# Patient Record
Sex: MALE | Race: WHITE | NOT HISPANIC OR LATINO | Employment: PART TIME | URBAN - METROPOLITAN AREA
[De-identification: names, ages, dates, MRNs, and addresses within clinical notes are randomized per-mention and may not be internally consistent; named-entity substitution may affect disease eponyms.]

---

## 2023-05-04 ENCOUNTER — OFFICE VISIT (OUTPATIENT)
Dept: FAMILY MEDICINE | Facility: CLINIC | Age: 43
End: 2023-05-04
Payer: COMMERCIAL

## 2023-05-04 ENCOUNTER — LAB VISIT (OUTPATIENT)
Dept: LAB | Facility: HOSPITAL | Age: 43
End: 2023-05-04
Attending: FAMILY MEDICINE
Payer: COMMERCIAL

## 2023-05-04 VITALS
HEART RATE: 94 BPM | SYSTOLIC BLOOD PRESSURE: 136 MMHG | DIASTOLIC BLOOD PRESSURE: 88 MMHG | WEIGHT: 214.38 LBS | HEIGHT: 72 IN | BODY MASS INDEX: 29.04 KG/M2 | OXYGEN SATURATION: 98 % | RESPIRATION RATE: 15 BRPM

## 2023-05-04 DIAGNOSIS — R19.7 DIARRHEA OF PRESUMED INFECTIOUS ORIGIN: ICD-10-CM

## 2023-05-04 DIAGNOSIS — R11.14 BILIOUS VOMITING WITH NAUSEA: Primary | ICD-10-CM

## 2023-05-04 DIAGNOSIS — R11.14 BILIOUS VOMITING WITH NAUSEA: ICD-10-CM

## 2023-05-04 DIAGNOSIS — R42 DIZZINESS: ICD-10-CM

## 2023-05-04 LAB
ANION GAP SERPL CALC-SCNC: 11 MMOL/L (ref 8–16)
BASOPHILS # BLD AUTO: 0.03 K/UL (ref 0–0.2)
BASOPHILS NFR BLD: 0.5 % (ref 0–1.9)
BUN SERPL-MCNC: 14 MG/DL (ref 6–20)
CALCIUM SERPL-MCNC: 9.4 MG/DL (ref 8.7–10.5)
CHLORIDE SERPL-SCNC: 108 MMOL/L (ref 95–110)
CO2 SERPL-SCNC: 21 MMOL/L (ref 23–29)
CREAT SERPL-MCNC: 0.9 MG/DL (ref 0.5–1.4)
DIFFERENTIAL METHOD: NORMAL
EOSINOPHIL # BLD AUTO: 0.1 K/UL (ref 0–0.5)
EOSINOPHIL NFR BLD: 1.8 % (ref 0–8)
ERYTHROCYTE [DISTWIDTH] IN BLOOD BY AUTOMATED COUNT: 12.6 % (ref 11.5–14.5)
EST. GFR  (NO RACE VARIABLE): >60 ML/MIN/1.73 M^2
GLUCOSE SERPL-MCNC: 115 MG/DL (ref 70–110)
HCT VFR BLD AUTO: 46.8 % (ref 40–54)
HGB BLD-MCNC: 15.9 G/DL (ref 14–18)
IMM GRANULOCYTES # BLD AUTO: 0.01 K/UL (ref 0–0.04)
IMM GRANULOCYTES NFR BLD AUTO: 0.2 % (ref 0–0.5)
LYMPHOCYTES # BLD AUTO: 2.1 K/UL (ref 1–4.8)
LYMPHOCYTES NFR BLD: 33 % (ref 18–48)
MCH RBC QN AUTO: 30.5 PG (ref 27–31)
MCHC RBC AUTO-ENTMCNC: 34 G/DL (ref 32–36)
MCV RBC AUTO: 90 FL (ref 82–98)
MONOCYTES # BLD AUTO: 0.5 K/UL (ref 0.3–1)
MONOCYTES NFR BLD: 7.7 % (ref 4–15)
NEUTROPHILS # BLD AUTO: 3.6 K/UL (ref 1.8–7.7)
NEUTROPHILS NFR BLD: 56.8 % (ref 38–73)
NRBC BLD-RTO: 0 /100 WBC
PLATELET # BLD AUTO: 293 K/UL (ref 150–450)
PMV BLD AUTO: 11.5 FL (ref 9.2–12.9)
POTASSIUM SERPL-SCNC: 3.5 MMOL/L (ref 3.5–5.1)
RBC # BLD AUTO: 5.22 M/UL (ref 4.6–6.2)
SODIUM SERPL-SCNC: 140 MMOL/L (ref 136–145)
WBC # BLD AUTO: 6.25 K/UL (ref 3.9–12.7)

## 2023-05-04 PROCEDURE — 3008F PR BODY MASS INDEX (BMI) DOCUMENTED: ICD-10-PCS | Mod: S$GLB,,, | Performed by: FAMILY MEDICINE

## 2023-05-04 PROCEDURE — 3079F DIAST BP 80-89 MM HG: CPT | Mod: S$GLB,,, | Performed by: FAMILY MEDICINE

## 2023-05-04 PROCEDURE — 99999 PR PBB SHADOW E&M-NEW PATIENT-LVL III: ICD-10-PCS | Mod: PBBFAC,,, | Performed by: FAMILY MEDICINE

## 2023-05-04 PROCEDURE — 3008F BODY MASS INDEX DOCD: CPT | Mod: S$GLB,,, | Performed by: FAMILY MEDICINE

## 2023-05-04 PROCEDURE — 99203 OFFICE O/P NEW LOW 30 MIN: CPT | Mod: S$GLB,,, | Performed by: FAMILY MEDICINE

## 2023-05-04 PROCEDURE — 1160F PR REVIEW ALL MEDS BY PRESCRIBER/CLIN PHARMACIST DOCUMENTED: ICD-10-PCS | Mod: S$GLB,,, | Performed by: FAMILY MEDICINE

## 2023-05-04 PROCEDURE — 99203 PR OFFICE/OUTPT VISIT, NEW, LEVL III, 30-44 MIN: ICD-10-PCS | Mod: S$GLB,,, | Performed by: FAMILY MEDICINE

## 2023-05-04 PROCEDURE — 3075F SYST BP GE 130 - 139MM HG: CPT | Mod: S$GLB,,, | Performed by: FAMILY MEDICINE

## 2023-05-04 PROCEDURE — 1159F PR MEDICATION LIST DOCUMENTED IN MEDICAL RECORD: ICD-10-PCS | Mod: S$GLB,,, | Performed by: FAMILY MEDICINE

## 2023-05-04 PROCEDURE — 3075F PR MOST RECENT SYSTOLIC BLOOD PRESS GE 130-139MM HG: ICD-10-PCS | Mod: S$GLB,,, | Performed by: FAMILY MEDICINE

## 2023-05-04 PROCEDURE — 1159F MED LIST DOCD IN RCRD: CPT | Mod: S$GLB,,, | Performed by: FAMILY MEDICINE

## 2023-05-04 PROCEDURE — 3079F PR MOST RECENT DIASTOLIC BLOOD PRESSURE 80-89 MM HG: ICD-10-PCS | Mod: S$GLB,,, | Performed by: FAMILY MEDICINE

## 2023-05-04 PROCEDURE — 80048 BASIC METABOLIC PNL TOTAL CA: CPT | Performed by: FAMILY MEDICINE

## 2023-05-04 PROCEDURE — 99999 PR PBB SHADOW E&M-NEW PATIENT-LVL III: CPT | Mod: PBBFAC,,, | Performed by: FAMILY MEDICINE

## 2023-05-04 PROCEDURE — 85025 COMPLETE CBC W/AUTO DIFF WBC: CPT | Performed by: FAMILY MEDICINE

## 2023-05-04 PROCEDURE — 36415 COLL VENOUS BLD VENIPUNCTURE: CPT | Performed by: FAMILY MEDICINE

## 2023-05-04 PROCEDURE — 1160F RVW MEDS BY RX/DR IN RCRD: CPT | Mod: S$GLB,,, | Performed by: FAMILY MEDICINE

## 2023-05-04 RX ORDER — ONDANSETRON 4 MG/1
4 TABLET, ORALLY DISINTEGRATING ORAL EVERY 6 HOURS PRN
Qty: 30 TABLET | Refills: 0 | Status: SHIPPED | OUTPATIENT
Start: 2023-05-04 | End: 2023-05-17

## 2023-05-04 NOTE — PROGRESS NOTES
Subjective:       Patient ID: Luigi Daniels is a 43 y.o. male.    Chief Complaint: Nausea, Fatigue, and Diarrhea    Started with nausea and vomiting on Saturday. He thought it was from the paint. His balance is off. Feels like the room is spinning. He is having some diarrhea. He will have significant abdominal cramping with minimal issues. Generally he is feeling somewhat decent. But overall his stomach is hurting diffusely and and he is nauseated. He does get some relief when he uses the bathroom. No blood in his bowel movements. The first day he vomited ramen and watermelon. The next day he could keep down a hamburger. Pickles make him feel better. He is able to keep liquids down now.     Nausea  Associated symptoms include fatigue, nausea and vertigo. Pertinent negatives include no chest pain or headaches.   Fatigue  Associated symptoms include fatigue, nausea and vertigo. Pertinent negatives include no chest pain or headaches.   Diarrhea   Pertinent negatives include no headaches.   Review of Systems   Constitutional:  Positive for fatigue.   Cardiovascular:  Negative for chest pain and palpitations.   Gastrointestinal:  Positive for diarrhea and nausea.   Neurological:  Positive for dizziness and vertigo. Negative for syncope, speech difficulty, headaches, coordination difficulties, memory loss and coordination difficulties.       Objective:      Physical Exam  Vitals and nursing note reviewed.   Constitutional:       General: He is not in acute distress.     Appearance: He is ill-appearing.   HENT:      Right Ear: Tympanic membrane, ear canal and external ear normal.      Left Ear: Tympanic membrane, ear canal and external ear normal.   Cardiovascular:      Rate and Rhythm: Normal rate and regular rhythm.      Heart sounds: No murmur heard.  Pulmonary:      Effort: Pulmonary effort is normal.      Breath sounds: Normal breath sounds. No wheezing.   Abdominal:      General: Abdomen is flat.      Palpations:  Abdomen is soft. There is no mass.      Tenderness: There is abdominal tenderness (mild diffuse- non focal- not acute). There is no right CVA tenderness or left CVA tenderness.      Hernia: No hernia is present.   Skin:     General: Skin is warm and dry.      Findings: No rash.   Neurological:      Mental Status: He is alert.   Psychiatric:         Mood and Affect: Mood normal.         Behavior: Behavior normal.       Assessment:       1. Bilious vomiting with nausea    2. Diarrhea of presumed infectious origin    3. Dizziness        Plan:       Problem List Items Addressed This Visit    None  Visit Diagnoses       Bilious vomiting with nausea    -  Primary    Relevant Medications    ondansetron (ZOFRAN-ODT) 4 MG TbDL    Other Relevant Orders    Basic Metabolic Panel    CBC Auto Differential    Diarrhea of presumed infectious origin        Dizziness        Relevant Orders    Basic Metabolic Panel    CBC Auto Differential

## 2023-05-06 ENCOUNTER — OFFICE VISIT (OUTPATIENT)
Dept: URGENT CARE | Facility: CLINIC | Age: 43
End: 2023-05-06
Payer: COMMERCIAL

## 2023-05-06 ENCOUNTER — PATIENT MESSAGE (OUTPATIENT)
Dept: FAMILY MEDICINE | Facility: CLINIC | Age: 43
End: 2023-05-06
Payer: COMMERCIAL

## 2023-05-06 VITALS
BODY MASS INDEX: 28.99 KG/M2 | HEIGHT: 72 IN | HEART RATE: 75 BPM | SYSTOLIC BLOOD PRESSURE: 148 MMHG | OXYGEN SATURATION: 96 % | TEMPERATURE: 98 F | WEIGHT: 214 LBS | DIASTOLIC BLOOD PRESSURE: 100 MMHG

## 2023-05-06 DIAGNOSIS — R42 DIZZINESS: Primary | ICD-10-CM

## 2023-05-06 PROCEDURE — 99213 OFFICE O/P EST LOW 20 MIN: CPT | Mod: ,,, | Performed by: NURSE PRACTITIONER

## 2023-05-06 PROCEDURE — 99213 PR OFFICE/OUTPT VISIT, EST, LEVL III, 20-29 MIN: ICD-10-PCS | Mod: ,,, | Performed by: NURSE PRACTITIONER

## 2023-05-06 RX ORDER — MECLIZINE HYDROCHLORIDE 25 MG/1
25 TABLET ORAL EVERY 8 HOURS PRN
Qty: 15 TABLET | Refills: 0 | Status: SHIPPED | OUTPATIENT
Start: 2023-05-06 | End: 2023-05-17

## 2023-05-06 NOTE — PROGRESS NOTES
Subjective:       Patient ID: Luigi Daniels is a 43 y.o. male.    Vitals:  height is 6' (1.829 m) and weight is 97.1 kg (214 lb). His oral temperature is 98.3 °F (36.8 °C). His blood pressure is 142/118 (abnormal, pended) and his pulse is 75. His oxygen saturation is 96%.     Chief Complaint: Dizziness (Dizziness x 1 week. )    This is a 43 y.o. male who presents today with a chief complaint of dizziness x 1 week.   Patient went to see Dr. Christiansen on Thursday and prescribed zofran.    Pt reports some improvement after taking this along with zofran and dramamine but reports dizziness is still present today. Pt reports symptoms improve after laying down. Pt denies any other symptoms of chest pain or systemic symptoms.         Dizziness:   Chronicity:  Recurrent  Onset:  1 to 4 weeks ago  Progression since onset:  Unchanged  Pain Scale:  0/10  Severity:  Mild  Duration:  Very brief  Dizziness characteristics:  Off-balance and motion-sickness  Time frame: when he walks     he is dizzy.   Associated symptoms: headaches and light-headedness.no diaphoresis, no syncope, no palpitations, no facial weakness and no chest pain.  Exacerbated by: walking.  Treatments tried: zofran.  Improvements on treatment:  Mild    Constitution: Negative. Negative for chills, sweating, fatigue and generalized weakness.   HENT: Negative.     Neck: neck negative.   Cardiovascular: Negative.  Negative for chest pain, palpitations, sob on exertion and passing out.   Respiratory: Negative.     Gastrointestinal: Negative.    Neurological:  Positive for dizziness, light-headedness and headaches. Negative for passing out, speech difficulty, coordination disturbances, loss of balance, disorientation and altered mental status.   Psychiatric/Behavioral:  Negative for altered mental status, disorientation, confusion, agitation and nervous/anxious. The patient is not nervous/anxious.          Objective:      Physical Exam   Constitutional: He is oriented  to person, place, and time. He appears well-developed. He is cooperative.   HENT:   Head: Normocephalic and atraumatic.   Ears:   Right Ear: Hearing, tympanic membrane, external ear and ear canal normal.   Left Ear: Hearing, tympanic membrane, external ear and ear canal normal.   Nose: Nose normal. No mucosal edema or nasal deformity. No epistaxis. Right sinus exhibits no maxillary sinus tenderness and no frontal sinus tenderness. Left sinus exhibits no maxillary sinus tenderness and no frontal sinus tenderness.   Mouth/Throat: Uvula is midline, oropharynx is clear and moist and mucous membranes are normal. No trismus in the jaw. Normal dentition. No uvula swelling.   Eyes: Conjunctivae and lids are normal.   Neck: Trachea normal and phonation normal. Neck supple.   Cardiovascular: Normal rate, regular rhythm, normal heart sounds and normal pulses.   Pulmonary/Chest: Effort normal and breath sounds normal. He has no decreased breath sounds. He has no wheezes. He has no rhonchi. He has no rales.   Abdominal: Normal appearance and bowel sounds are normal. Soft.   Musculoskeletal: Normal range of motion.         General: Normal range of motion.   Neurological: He is alert and oriented to person, place, and time. He exhibits normal muscle tone.   Skin: Skin is warm, dry and intact.   Psychiatric: His speech is normal and behavior is normal. Judgment and thought content normal.   Nursing note and vitals reviewed.      Past medical history and current medications reviewed.       Assessment:           1. Dizziness              Plan:         Dizziness  -     meclizine (ANTIVERT) 25 mg tablet; Take 1 tablet (25 mg total) by mouth every 8 (eight) hours as needed for Dizziness.  Dispense: 15 tablet; Refill: 0             Patient Instructions   Report to ER for any worsening of symptoms.   May continue to take zofran PRN.   Follow up with PCP for further work up and evaluation.            Medical Decision Making:   Urgent Care  Management:  Orthostat BP does have a decrease when rising from lying to sitting to standing. Pt reports that he feels no chagne in symptoms when changing positions. I will treat with meclizine symptomatically and pt will follow up with PCP for momo tesfaye.

## 2023-05-06 NOTE — PATIENT INSTRUCTIONS
Report to ER for any worsening of symptoms.   May continue to take zofran PRN.   Follow up with PCP for further work up and evaluation.

## 2023-05-08 ENCOUNTER — TELEPHONE (OUTPATIENT)
Dept: FAMILY MEDICINE | Facility: CLINIC | Age: 43
End: 2023-05-08
Payer: COMMERCIAL

## 2023-05-08 NOTE — TELEPHONE ENCOUNTER
I do not have any cholesterol labs on him (we will need these sent our way).   Exam on the day I saw him was consistent with peripheral vertigo.  If this is all vertigo- may be relieved by home Epley maneuvers- recommend YouTubing these- and sleeping in a reclined position after completing.   I can refer to Ear, Nose and Throat for the Vertigo for evaluation if he would like. We do have an ENT that works out of the [place] office.  For cardiovascular eval and establish care visit- we'll have to get him back in to see me.   In the meantime, if he has any focal neurologic symptoms, change in speech, limb weakness, facial droop, numbness, needs ED eval.

## 2023-05-08 NOTE — TELEPHONE ENCOUNTER
"Spoke with pt. Pt reports  dizzy and light headed, disoriented at time. Pt states, " Dr. Christiansen told me to just message her and let her know. My body feels ok for the most part, I should not still feel like this after a week" please review.   "

## 2023-05-08 NOTE — TELEPHONE ENCOUNTER
Any improvement with the antivert prescribed by urgent care?   Any positional change to the dizziness- ie is it different with different head positions?  Any pain in the ears or change to hearing?   Is the abdominal pain, nausea, diarrhea better?  Any new headache?  Lightheadedness v. Dizziness- is the room spinning in one direction or does he feel like he's getting tunnel vision and going to pass out?

## 2023-05-08 NOTE — TELEPHONE ENCOUNTER
"Any improvement with the antivert prescribed by urgent care? Yes  Any positional change to the dizziness- ie is it different with different head positions? Yes   Any pain in the ears or change to hearing? No pain at this time  Is the abdominal pain, nausea, diarrhea better? All symptoms resolved.   Any new headache? No.   Lightheadedness v. Dizziness- is the room spinning in one direction or does he feel like he's getting tunnel vision and going to pass out. Pt states the tunnel vision.   Pt states, " I know my cholesterol is high, I feel this has something to do with it. "   "

## 2023-05-08 NOTE — TELEPHONE ENCOUNTER
----- Message from Zhanna Chowdhury sent at 5/8/2023 11:31 AM CDT -----  Regarding: sooner apt  Contact: patient  Type:  Sooner Appointment Request    Caller is requesting a sooner appointment.  Caller declined first available appointment listed below.  Caller will not accept being placed on the waitlist and is requesting a message be sent to doctor.    Name of Caller:  Patient  When is the first available appointment?    Symptoms:  dizzy and light headed cant walk straight  Best Call Back Number:  [phone]     Additional Information:  Please call to schedule thanks!

## 2023-05-09 ENCOUNTER — TELEPHONE (OUTPATIENT)
Dept: FAMILY MEDICINE | Facility: CLINIC | Age: 43
End: 2023-05-09
Payer: COMMERCIAL

## 2023-05-09 ENCOUNTER — TELEPHONE (OUTPATIENT)
Dept: CARDIOLOGY | Facility: CLINIC | Age: 43
End: 2023-05-09
Payer: COMMERCIAL

## 2023-05-09 NOTE — TELEPHONE ENCOUNTER
Returned pts call. He said he can't walk without dropping to the ground. He gets sharp pains in his chest. Numbness and tingling in his right arm. He has a feeling of something being off. He is worried about blood clots and heart problems. I advised since his symptoms are worsening from when he saw Dr. Christiansen to go to the ED so he can be evaluated. He stated he would speak with his wife.   ----- Message from Zhanna Chowdhury sent at 5/9/2023 10:17 AM CDT -----  Regarding: advise  Contact: patient  Type: Needs Medical Advice  Who Called:  Patient  Symptoms (please be specific):  trouble walking  How long has patient had these symptoms:    Pharmacy name and phone #:    Best Call Back Number: [phone]     Additional Information: Please call to schedule and advise. He would like to be seen this week. thanks!         Patient/Mother

## 2023-05-09 NOTE — TELEPHONE ENCOUNTER
----- Message from Idalia Barragan LPN sent at 5/9/2023 11:44 AM CDT -----  Regarding: FW: advise  Contact: patient  Returned pts call. He stated his symptoms are getting worse. He said he takes a few steps and drops to the ground. He is having sharp pains in his chest with numbness in his right arm. He stated he is worried he has blood clots. I advised him to go to the ED for a full evaluation since they can see him now and have him fully tested. I know he has an appt with you 05/17. We can follow up if necessary. Just letting you know that he may go to ED.     Thanks,     Idalia Barragan LPN  ----- Message -----  From: Zhanna Chowdhury  Sent: 5/9/2023  10:18 AM CDT  To: Manny VERONICA Staff  Subject: advise                                           Type: Needs Medical Advice  Who Called:  Patient  Symptoms (please be specific):  trouble walking  How long has patient had these symptoms:    Pharmacy name and phone #:    Best Call Back Number: [phone]     Additional Information: Please call to schedule and advise. He would like to be seen this week. thanks!

## 2023-05-10 ENCOUNTER — OFFICE VISIT (OUTPATIENT)
Dept: OTOLARYNGOLOGY | Facility: CLINIC | Age: 43
End: 2023-05-10
Payer: COMMERCIAL

## 2023-05-10 VITALS — DIASTOLIC BLOOD PRESSURE: 97 MMHG | WEIGHT: 213.81 LBS | BODY MASS INDEX: 29 KG/M2 | SYSTOLIC BLOOD PRESSURE: 145 MMHG

## 2023-05-10 DIAGNOSIS — R42 DIZZINESS: ICD-10-CM

## 2023-05-10 DIAGNOSIS — Z11.59 NEED FOR HEPATITIS C SCREENING TEST: ICD-10-CM

## 2023-05-10 PROCEDURE — 99203 OFFICE O/P NEW LOW 30 MIN: CPT | Mod: S$GLB,,, | Performed by: OTOLARYNGOLOGY

## 2023-05-10 PROCEDURE — 3008F BODY MASS INDEX DOCD: CPT | Mod: S$GLB,,, | Performed by: OTOLARYNGOLOGY

## 2023-05-10 PROCEDURE — 99999 PR PBB SHADOW E&M-EST. PATIENT-LVL III: ICD-10-PCS | Mod: PBBFAC,,, | Performed by: OTOLARYNGOLOGY

## 2023-05-10 PROCEDURE — 3077F PR MOST RECENT SYSTOLIC BLOOD PRESSURE >= 140 MM HG: ICD-10-PCS | Mod: S$GLB,,, | Performed by: OTOLARYNGOLOGY

## 2023-05-10 PROCEDURE — 3008F PR BODY MASS INDEX (BMI) DOCUMENTED: ICD-10-PCS | Mod: S$GLB,,, | Performed by: OTOLARYNGOLOGY

## 2023-05-10 PROCEDURE — 3080F PR MOST RECENT DIASTOLIC BLOOD PRESSURE >= 90 MM HG: ICD-10-PCS | Mod: S$GLB,,, | Performed by: OTOLARYNGOLOGY

## 2023-05-10 PROCEDURE — 99999 PR PBB SHADOW E&M-EST. PATIENT-LVL III: CPT | Mod: PBBFAC,,, | Performed by: OTOLARYNGOLOGY

## 2023-05-10 PROCEDURE — 3080F DIAST BP >= 90 MM HG: CPT | Mod: S$GLB,,, | Performed by: OTOLARYNGOLOGY

## 2023-05-10 PROCEDURE — 3077F SYST BP >= 140 MM HG: CPT | Mod: S$GLB,,, | Performed by: OTOLARYNGOLOGY

## 2023-05-10 PROCEDURE — 99203 PR OFFICE/OUTPT VISIT, NEW, LEVL III, 30-44 MIN: ICD-10-PCS | Mod: S$GLB,,, | Performed by: OTOLARYNGOLOGY

## 2023-05-10 NOTE — PROGRESS NOTES
Subjective:       Patient ID: Luigi Daniels is a 43 y.o. male.    Chief Complaint: Dizziness (Pt c/o dizziness, off balance, and blurred vision since for almost two weeks. )      This patient presents with a history of 10 days ago all of a sudden it home he developed nausea and vomiting and some dizziness he makes it hard to really help me understand whether the dizziness caused vomiting or it was all coming together but it also included GI cramping and diarrhea.  The GI cramping and diarrhea would not typically be a secondary results of vertigo where as the nausea certainly is common.  In any case he is not having anymore dizziness but he does feel off balance when he tries to walk he is not dizzy at all lying in bed although initially he was so ill and vomiting that he was but that was 10 days ago.      Now he is off balance but he also mentions that he recently had an orthostatic test where his blood pressure dropped significantly on rising it was being tested by primary care provider.  He is worried about his cholesterol but as I told him that is certainly has not issue for long-term concerned but not likely related to his recent sudden onset of dizziness vomiting diarrhea and since that time improvement but still reduced balance sensation.  He is looked on the Internet about different types of repositioning maneuvers for positional vertigo and the sudden onset an initial vertigo are suggestive of that but some of the other aspects of it are not so much suggestive of that especially the diarrhea and GI side effects.                Objective:      ENT Physical Exam  Constitutional  Appearance: patient appears well-developed, well-nourished and well-groomed,  Communication/Voice: communication appropriate for developmental age; vocal quality normal;  Head and Face  Appearance: head appears normal, face appears normal and face appears atraumatic;  Salivary: glands normal;  Ear  Hearing: intact;  Auricles: right  auricle normal; left auricle normal;  Ear Canals: right ear canal normal; left ear canal normal;  Tympanic Membranes: right tympanic membrane normal; left tympanic membrane normal;  Nose  External Nose: nares patent bilaterally; external nose normal;  Internal Nose: nasal mucosa normal; septum normal; bilateral inferior turbinates normal;  Oral Cavity/Oropharynx  Lips: normal;  Teeth: normal;  Gums: gingiva normal;  Tongue: normal;  Oral mucosa: normal;  Hard palate: normal;  Soft palate: normal;  Tonsils: normal;  Base of Tongue: normal;  Posterior pharyngeal wall: normal;  Neck  Neck: neck normal; neck palpation normal;  Thyroid: thyroid normal;  Lymphatic  Palpation: lymph nodes normal;        Assessment:       1. Dizziness         Plan:          So while his story is not perfectly typical of positional vertigo it is hard to know if that was a factor in his initial onset it was sudden onset there was dizziness and vomiting but then the diarrhea cramping and headaches are not typical of that at all     Since he was sent to me for the possibility of an inner ear problem and since he is still at home because he does not feel comfortable drivin yet because of his balance, although he walks pretty normally as I watch him in the office, I have given him left and right home Epley maneuvers describe how to do them and he is watched a bunch of things on the Internet that have conflicting information I have asked him to dismiss those and simply proceed with the information flares that I provided him which are carefully chosen typically performed before bed and went unclear of the left or right side one time can be done one day one side can be the other or lunch and bedtime depending on his schedule especially since he is still at home.    Some of the other issues such as his orthostatic hypotension and headaches I think for now he needs to give him a little more time because they flared up in the context of an acute onset  and he is getting better

## 2023-05-11 ENCOUNTER — TELEPHONE (OUTPATIENT)
Dept: OPHTHALMOLOGY | Facility: CLINIC | Age: 43
End: 2023-05-11
Payer: COMMERCIAL

## 2023-05-11 NOTE — TELEPHONE ENCOUNTER
Spoke to pt and scheduled cat eval       -TD     ----- Message from Finn Rollins sent at 5/11/2023  1:59 PM CDT -----      Name of Who is Calling:PT          What is the request in detail:PT is requesting a call back to discuss him wanting to be seen in your office, PT states he was referred to your clinic. Please be Advised!          Can the clinic reply by [facility]:no          What Number to Call Back if not in [phone]

## 2023-05-17 ENCOUNTER — LAB VISIT (OUTPATIENT)
Dept: LAB | Facility: HOSPITAL | Age: 43
End: 2023-05-17
Attending: FAMILY MEDICINE
Payer: COMMERCIAL

## 2023-05-17 ENCOUNTER — HOSPITAL ENCOUNTER (OUTPATIENT)
Dept: RADIOLOGY | Facility: HOSPITAL | Age: 43
Discharge: HOME | End: 2023-05-17
Attending: FAMILY MEDICINE
Payer: COMMERCIAL

## 2023-05-17 ENCOUNTER — OFFICE VISIT (OUTPATIENT)
Dept: FAMILY MEDICINE | Facility: CLINIC | Age: 43
End: 2023-05-17
Payer: COMMERCIAL

## 2023-05-17 VITALS
DIASTOLIC BLOOD PRESSURE: 90 MMHG | OXYGEN SATURATION: 97 % | RESPIRATION RATE: 15 BRPM | WEIGHT: 216 LBS | SYSTOLIC BLOOD PRESSURE: 135 MMHG | HEIGHT: 72 IN | HEART RATE: 78 BPM | BODY MASS INDEX: 29.26 KG/M2

## 2023-05-17 DIAGNOSIS — R73.9 HYPERGLYCEMIA: ICD-10-CM

## 2023-05-17 DIAGNOSIS — Z13.6 ENCOUNTER FOR LIPID SCREENING FOR CARDIOVASCULAR DISEASE: ICD-10-CM

## 2023-05-17 DIAGNOSIS — G89.29 CHRONIC PAIN OF RIGHT WRIST: ICD-10-CM

## 2023-05-17 DIAGNOSIS — Z13.220 ENCOUNTER FOR LIPID SCREENING FOR CARDIOVASCULAR DISEASE: ICD-10-CM

## 2023-05-17 DIAGNOSIS — Z00.00 ROUTINE GENERAL MEDICAL EXAMINATION AT A HEALTH CARE FACILITY: Primary | ICD-10-CM

## 2023-05-17 DIAGNOSIS — Z11.59 NEED FOR HEPATITIS C SCREENING TEST: ICD-10-CM

## 2023-05-17 DIAGNOSIS — M25.531 CHRONIC PAIN OF RIGHT WRIST: ICD-10-CM

## 2023-05-17 DIAGNOSIS — M25.531 RIGHT WRIST PAIN: ICD-10-CM

## 2023-05-17 LAB
CHOLEST SERPL-MCNC: 214 MG/DL (ref 120–199)
CHOLEST/HDLC SERPL: 4.8 {RATIO} (ref 2–5)
ESTIMATED AVG GLUCOSE: 108 MG/DL (ref 68–131)
HBA1C MFR BLD: 5.4 % (ref 4–5.6)
HCV AB SERPL QL IA: NORMAL
HDLC SERPL-MCNC: 45 MG/DL (ref 40–75)
HDLC SERPL: 21 % (ref 20–50)
LDLC SERPL CALC-MCNC: 140.4 MG/DL (ref 63–159)
NONHDLC SERPL-MCNC: 169 MG/DL
TRIGL SERPL-MCNC: 143 MG/DL (ref 30–150)

## 2023-05-17 PROCEDURE — 3008F BODY MASS INDEX DOCD: CPT | Mod: ,,, | Performed by: FAMILY MEDICINE

## 2023-05-17 PROCEDURE — 73110 XR WRIST COMPLETE 3 VIEWS RIGHT: ICD-10-PCS | Mod: 26,RT,, | Performed by: RADIOLOGY

## 2023-05-17 PROCEDURE — 83036 HEMOGLOBIN GLYCOSYLATED A1C: CPT | Performed by: FAMILY MEDICINE

## 2023-05-17 PROCEDURE — 86803 HEPATITIS C AB TEST: CPT | Performed by: FAMILY MEDICINE

## 2023-05-17 PROCEDURE — 99396 PREV VISIT EST AGE 40-64: CPT | Mod: ,,, | Performed by: FAMILY MEDICINE

## 2023-05-17 PROCEDURE — 3075F PR MOST RECENT SYSTOLIC BLOOD PRESS GE 130-139MM HG: ICD-10-PCS | Mod: ,,, | Performed by: FAMILY MEDICINE

## 2023-05-17 PROCEDURE — 73110 X-RAY EXAM OF WRIST: CPT | Mod: TC,PN,RT

## 2023-05-17 PROCEDURE — 1159F PR MEDICATION LIST DOCUMENTED IN MEDICAL RECORD: ICD-10-PCS | Mod: ,,, | Performed by: FAMILY MEDICINE

## 2023-05-17 PROCEDURE — 1159F MED LIST DOCD IN RCRD: CPT | Mod: ,,, | Performed by: FAMILY MEDICINE

## 2023-05-17 PROCEDURE — 3080F DIAST BP >= 90 MM HG: CPT | Mod: ,,, | Performed by: FAMILY MEDICINE

## 2023-05-17 PROCEDURE — 80061 LIPID PANEL: CPT | Performed by: FAMILY MEDICINE

## 2023-05-17 PROCEDURE — 3008F PR BODY MASS INDEX (BMI) DOCUMENTED: ICD-10-PCS | Mod: ,,, | Performed by: FAMILY MEDICINE

## 2023-05-17 PROCEDURE — 36415 COLL VENOUS BLD VENIPUNCTURE: CPT | Performed by: FAMILY MEDICINE

## 2023-05-17 PROCEDURE — 3075F SYST BP GE 130 - 139MM HG: CPT | Mod: ,,, | Performed by: FAMILY MEDICINE

## 2023-05-17 PROCEDURE — 99999 PR PBB SHADOW E&M-EST. PATIENT-LVL III: ICD-10-PCS | Mod: PBBFAC,,, | Performed by: FAMILY MEDICINE

## 2023-05-17 PROCEDURE — 1160F PR REVIEW ALL MEDS BY PRESCRIBER/CLIN PHARMACIST DOCUMENTED: ICD-10-PCS | Mod: ,,, | Performed by: FAMILY MEDICINE

## 2023-05-17 PROCEDURE — 73110 X-RAY EXAM OF WRIST: CPT | Mod: 26,RT,, | Performed by: RADIOLOGY

## 2023-05-17 PROCEDURE — 99999 PR PBB SHADOW E&M-EST. PATIENT-LVL III: CPT | Mod: PBBFAC,,, | Performed by: FAMILY MEDICINE

## 2023-05-17 PROCEDURE — 99396 PR PREVENTIVE VISIT,EST,40-64: ICD-10-PCS | Mod: ,,, | Performed by: FAMILY MEDICINE

## 2023-05-17 PROCEDURE — 3080F PR MOST RECENT DIASTOLIC BLOOD PRESSURE >= 90 MM HG: ICD-10-PCS | Mod: ,,, | Performed by: FAMILY MEDICINE

## 2023-05-17 PROCEDURE — 1160F RVW MEDS BY RX/DR IN RCRD: CPT | Mod: ,,, | Performed by: FAMILY MEDICINE

## 2023-05-17 NOTE — PROGRESS NOTES
"Subjective:       Patient ID: Luigi Daniels is a 43 y.o. male.    Chief Complaint: Follow-up and Arm Pain (R - states it has been going on for a long time, but worsening over the "few months" )    Mr. Daniels presents today for annual exam.   He does also complain of some right wrist/base of thumb pain and difficult turning a screwdriver or can opener x 1 year.     He would like to have cholesterol checked.     Dizziness has improved.     Review of Systems   Constitutional:  Negative for activity change, appetite change and fatigue.   Eyes:  Negative for visual disturbance.   Respiratory:  Negative for shortness of breath.    Cardiovascular:  Negative for chest pain.   Gastrointestinal:  Negative for constipation and diarrhea.   Endocrine: Negative for polydipsia, polyphagia and polyuria.   Genitourinary:  Negative for decreased urine volume.   Musculoskeletal:  Positive for arthralgias (right wrist/base of thumb).   Integumentary:  Negative for rash.   Neurological:  Negative for headaches.   Psychiatric/Behavioral:  Negative for sleep disturbance.        Objective:      Physical Exam  Vitals and nursing note reviewed.   Constitutional:       General: He is not in acute distress.     Appearance: He is not ill-appearing.   Cardiovascular:      Rate and Rhythm: Normal rate and regular rhythm.      Heart sounds: No murmur heard.  Pulmonary:      Effort: Pulmonary effort is normal.      Breath sounds: Normal breath sounds. No wheezing.   Musculoskeletal:      Right hand: Swelling (near thenar eminence) and tenderness (at base of right thumb) present. Decreased strength (reported by patient). Normal sensation. Normal capillary refill. Normal pulse.      Comments: No thenar wasting appreciated. Negative phalens and tinels   Skin:     General: Skin is warm and dry.      Findings: No rash.   Neurological:      Mental Status: He is alert.   Psychiatric:         Mood and Affect: Mood normal.         Behavior: Behavior " normal.       Assessment:       1. Routine general medical examination at a health care facility    2. Encounter for lipid screening for cardiovascular disease    3. Right wrist pain    4. Hyperglycemia    5. Chronic pain of right wrist        Plan:         1. Routine general medical examination at a health care facility    2. Encounter for lipid screening for cardiovascular disease  -     Lipid Panel; Future; Expected date: 05/17/2023    3. Right wrist pain    4. Hyperglycemia  -     Hemoglobin A1C; Future; Expected date: 05/17/2023    5. Chronic pain of right wrist  -     X-Ray Wrist Complete Right; Future; Expected date: 05/17/2023

## 2023-06-21 ENCOUNTER — TELEPHONE (OUTPATIENT)
Dept: OPHTHALMOLOGY | Facility: CLINIC | Age: 43
End: 2023-06-21
Payer: COMMERCIAL

## 2023-07-06 ENCOUNTER — OFFICE VISIT (OUTPATIENT)
Dept: OPHTHALMOLOGY | Facility: CLINIC | Age: 43
End: 2023-07-06
Payer: COMMERCIAL

## 2023-07-06 DIAGNOSIS — H40.1334 PIGMENTARY GLAUCOMA, BILATERAL, INDETERMINATE STAGE: Primary | ICD-10-CM

## 2023-07-06 DIAGNOSIS — H43.812 POSTERIOR VITREOUS DETACHMENT OF LEFT EYE: ICD-10-CM

## 2023-07-06 PROCEDURE — 92020 PR SPECIAL EYE EVAL,GONIOSCOPY: ICD-10-PCS | Mod: S$GLB,,, | Performed by: OPHTHALMOLOGY

## 2023-07-06 PROCEDURE — 92133 CPTRZD OPH DX IMG PST SGM ON: CPT | Mod: S$GLB,,, | Performed by: OPHTHALMOLOGY

## 2023-07-06 PROCEDURE — 92020 GONIOSCOPY: CPT | Mod: S$GLB,,, | Performed by: OPHTHALMOLOGY

## 2023-07-06 PROCEDURE — 76514 ECHO EXAM OF EYE THICKNESS: CPT | Mod: S$GLB,,, | Performed by: OPHTHALMOLOGY

## 2023-07-06 PROCEDURE — 99999 PR PBB SHADOW E&M-EST. PATIENT-LVL II: CPT | Mod: PBBFAC,,, | Performed by: OPHTHALMOLOGY

## 2023-07-06 PROCEDURE — 3044F HG A1C LEVEL LT 7.0%: CPT | Mod: CPTII,S$GLB,, | Performed by: OPHTHALMOLOGY

## 2023-07-06 PROCEDURE — 76514 PR  US, EYE, FOR CORNEAL THICKNESS: ICD-10-PCS | Mod: S$GLB,,, | Performed by: OPHTHALMOLOGY

## 2023-07-06 PROCEDURE — 1160F PR REVIEW ALL MEDS BY PRESCRIBER/CLIN PHARMACIST DOCUMENTED: ICD-10-PCS | Mod: CPTII,S$GLB,, | Performed by: OPHTHALMOLOGY

## 2023-07-06 PROCEDURE — 92133 POSTERIOR SEGMENT OCT OPTIC NERVE(OCULAR COHERENCE TOMOGRAPHY) - OU - BOTH EYES: ICD-10-PCS | Mod: S$GLB,,, | Performed by: OPHTHALMOLOGY

## 2023-07-06 PROCEDURE — 1159F PR MEDICATION LIST DOCUMENTED IN MEDICAL RECORD: ICD-10-PCS | Mod: CPTII,S$GLB,, | Performed by: OPHTHALMOLOGY

## 2023-07-06 PROCEDURE — 99999 PR PBB SHADOW E&M-EST. PATIENT-LVL II: ICD-10-PCS | Mod: PBBFAC,,, | Performed by: OPHTHALMOLOGY

## 2023-07-06 PROCEDURE — 3044F PR MOST RECENT HEMOGLOBIN A1C LEVEL <7.0%: ICD-10-PCS | Mod: CPTII,S$GLB,, | Performed by: OPHTHALMOLOGY

## 2023-07-06 PROCEDURE — 1159F MED LIST DOCD IN RCRD: CPT | Mod: CPTII,S$GLB,, | Performed by: OPHTHALMOLOGY

## 2023-07-06 PROCEDURE — 99204 PR OFFICE/OUTPT VISIT, NEW, LEVL IV, 45-59 MIN: ICD-10-PCS | Mod: S$GLB,,, | Performed by: OPHTHALMOLOGY

## 2023-07-06 PROCEDURE — 1160F RVW MEDS BY RX/DR IN RCRD: CPT | Mod: CPTII,S$GLB,, | Performed by: OPHTHALMOLOGY

## 2023-07-06 PROCEDURE — 99204 OFFICE O/P NEW MOD 45 MIN: CPT | Mod: S$GLB,,, | Performed by: OPHTHALMOLOGY

## 2023-07-06 RX ORDER — KETOROLAC TROMETHAMINE 5 MG/ML
1 SOLUTION OPHTHALMIC 4 TIMES DAILY
Qty: 5 ML | Refills: 0 | Status: SHIPPED | OUTPATIENT
Start: 2023-07-06 | End: 2023-07-10

## 2023-07-06 NOTE — PROGRESS NOTES
HPI    Pt states he has been having  blurry vision OU for years. States OS is   worse than OD. When closing OD vision is very cloudy. States has been   worse over the past few years. States has had DM testing and everything   has checked out. Denies pain/ FOL/ floaters.   Last edited by Noemí Bales on 7/6/2023  9:21 AM.            Assessment /Plan     For exam results, see Encounter Report.    Pigmentary glaucoma, bilateral, indeterminate stage    Posterior vitreous detachment of left eye      1. Pigmentary glaucoma, bilateral, indeterminate stage  Neg famhx  Thick pachy    Tmax 27/37 on presentation  +zentmeyer line OU  Severe damage OS, no evidence of damage OD  Discussed dx with patient, and prognosis  Recommend treatment, pt elects SLT    Schedule SLT OS  Will rx ketorolac for use after  Will likely schedule OD after    2. Posterior vitreous detachment of left eye  Rd precautions reviewed

## 2023-07-06 NOTE — PATIENT INSTRUCTIONS
What are floaters?     Floaters look like small specks, dots, circles, lines or cobwebs in your field of vision. While they seem to be in front of your eye, they are floating inside. Floaters are tiny clumps of gel or cells inside the vitreous that fills your eye. What you see are the shadows these clumps cast on your retina.    You usually notice floaters when looking  at something plain, like a blank wall or a blue ras.    As we age, our vitreous starts to thicken or shrink. Sometimes clumps or strands form in the vitreous. If the vitreous pulls away from the back of the eye, it is called posterior vitreous detachment. Floaters usually happen with posterior vitreous detachment. They are not serious, and they tend to fade or go away over time. Severe floaters can be removed by surgery, but this has risks and is seldom necessary.    You are more likely to get floaters if you:    are nearsighted (you need glasses to see far away)    have had surgery for cataracts    have had inflammation (swelling) inside the eye      What are flashes?     Flashes can look like flashing lights or lightning streaks in your field of vision. Some people compare them to seeing stars after being hit on the head. You might see flashes on and off for weeks, or even months. Flashes happen when the vitreous rubs or pulls on your retina.    As people age, it is common to see flashes occasionally.       When floaters and flashes are serious     Most floaters and flashes are not a problem. However, there are times when they can be signs of a serious condition. Here is when you should call an ophthalmologist right away:    you notice a lot of new floaters    you have a lot of flashes    a shadow appears in your peripheral (side) vision    a gray curtain covers part of  your vision    These floaters and flashes could be symptoms of a torn or detached retina. This is when the retina pulls away from the back of your eye. This is a serious  condition that needs to be treated.    Summary    Floaters are dark specks or dots in your field of vision. They are shadows you see from clumps of vitreous gel in your eye. Flashes are flashes of light that look like lightning streaks in your field of vision. Flashes occur when the vitreous gel rubs or pulls on your retina.    Floaters and flashes are quite common as people age. However, they can be signs of a retinal detachment, which is a serious problem. If you suddenly have a lot of floaters and see flashes, and you notice changes in your vision, call your ophthalmologist right away.

## 2023-07-21 ENCOUNTER — OFFICE VISIT (OUTPATIENT)
Dept: OPHTHALMOLOGY | Facility: CLINIC | Age: 43
End: 2023-07-21
Payer: COMMERCIAL

## 2023-07-21 DIAGNOSIS — H40.1334 PIGMENTARY GLAUCOMA, BILATERAL, INDETERMINATE STAGE: Primary | ICD-10-CM

## 2023-07-21 PROCEDURE — 65855 ARGON TRABECULOPLASTY - OS - LEFT EYE: ICD-10-PCS | Mod: LT,S$GLB,, | Performed by: OPHTHALMOLOGY

## 2023-07-21 PROCEDURE — 99499 NO LOS: ICD-10-PCS | Mod: S$GLB,,, | Performed by: OPHTHALMOLOGY

## 2023-07-21 PROCEDURE — 99499 UNLISTED E&M SERVICE: CPT | Mod: S$GLB,,, | Performed by: OPHTHALMOLOGY

## 2023-07-21 PROCEDURE — 65855 TRABECULOPLASTY LASER SURG: CPT | Mod: LT,S$GLB,, | Performed by: OPHTHALMOLOGY

## 2023-07-21 PROCEDURE — 99999 PR PBB SHADOW E&M-EST. PATIENT-LVL I: CPT | Mod: PBBFAC,,, | Performed by: OPHTHALMOLOGY

## 2023-07-21 PROCEDURE — 99999 PR PBB SHADOW E&M-EST. PATIENT-LVL I: ICD-10-PCS | Mod: PBBFAC,,, | Performed by: OPHTHALMOLOGY

## 2023-07-21 NOTE — PROGRESS NOTES
HPI    SLT OS     Pt states no new complaints. Denies pain/ FOL/ floaters.   No gtts.     Last edited by Noemí Bales on 7/21/2023  9:18 AM.            Assessment /Plan     For exam results, see Encounter Report.    Pigmentary glaucoma, bilateral, indeterminate stage  -     Argon Trabeculoplasty - OS - Left Eye      Pt with very high IOP upon presentation today OS  IOP improved with apraclonidine, dorz/conrad, and diamox 250mg, then SLT OS performed today, no complications    Use ketorolac QID OS x 4 days    F/u for SLT OD as scheduled.

## 2023-08-02 ENCOUNTER — TELEPHONE (OUTPATIENT)
Dept: OPHTHALMOLOGY | Facility: CLINIC | Age: 43
End: 2023-08-02
Payer: COMMERCIAL

## 2023-08-18 ENCOUNTER — OFFICE VISIT (OUTPATIENT)
Dept: OPHTHALMOLOGY | Facility: CLINIC | Age: 43
End: 2023-08-18
Payer: COMMERCIAL

## 2023-08-18 DIAGNOSIS — H40.1334 PIGMENTARY GLAUCOMA, BILATERAL, INDETERMINATE STAGE: Primary | ICD-10-CM

## 2023-08-18 PROCEDURE — 65855 ARGON TRABECULOPLASTY - OD - RIGHT EYE: ICD-10-PCS | Mod: RT,S$GLB,, | Performed by: OPHTHALMOLOGY

## 2023-08-18 PROCEDURE — 99999 PR PBB SHADOW E&M-EST. PATIENT-LVL II: ICD-10-PCS | Mod: PBBFAC,,, | Performed by: OPHTHALMOLOGY

## 2023-08-18 PROCEDURE — 99999 PR PBB SHADOW E&M-EST. PATIENT-LVL II: CPT | Mod: PBBFAC,,, | Performed by: OPHTHALMOLOGY

## 2023-08-18 PROCEDURE — 99499 NO LOS: ICD-10-PCS | Mod: S$GLB,,, | Performed by: OPHTHALMOLOGY

## 2023-08-18 PROCEDURE — 99499 UNLISTED E&M SERVICE: CPT | Mod: S$GLB,,, | Performed by: OPHTHALMOLOGY

## 2023-08-18 PROCEDURE — 65855 TRABECULOPLASTY LASER SURG: CPT | Mod: RT,S$GLB,, | Performed by: OPHTHALMOLOGY

## 2023-08-18 NOTE — PROGRESS NOTES
HPI    Pt presents for SLT OD and iop check OS following SLT OS     States no current ocular complaints.     No ocular meds     Last edited by Deedee Pelaez on 8/18/2023 10:31 AM.            Assessment /Plan     For exam results, see Encounter Report.    Pigmentary glaucoma, bilateral, indeterminate stage  -     Argon Trabeculoplasty - OD - Right Eye      SLT OD today  No complications    Appears to have a reasonable IOP response OS, but still elevated  Will likely start drops OS next visit    Use ketorolac QID OD x 4 days    F/u 6-8 weeks, IOP check OU

## 2023-09-29 ENCOUNTER — OFFICE VISIT (OUTPATIENT)
Dept: URGENT CARE | Facility: CLINIC | Age: 43
End: 2023-09-29
Payer: COMMERCIAL

## 2023-09-29 VITALS
WEIGHT: 216 LBS | TEMPERATURE: 98 F | HEART RATE: 96 BPM | DIASTOLIC BLOOD PRESSURE: 92 MMHG | BODY MASS INDEX: 29.26 KG/M2 | SYSTOLIC BLOOD PRESSURE: 148 MMHG | RESPIRATION RATE: 18 BRPM | HEIGHT: 72 IN | OXYGEN SATURATION: 98 %

## 2023-09-29 DIAGNOSIS — S46.812A STRAIN OF LEFT TRAPEZIUS MUSCLE, INITIAL ENCOUNTER: Primary | ICD-10-CM

## 2023-09-29 PROCEDURE — 99213 PR OFFICE/OUTPT VISIT, EST, LEVL III, 20-29 MIN: ICD-10-PCS | Mod: S$GLB,,, | Performed by: NURSE PRACTITIONER

## 2023-09-29 PROCEDURE — 99213 OFFICE O/P EST LOW 20 MIN: CPT | Mod: S$GLB,,, | Performed by: NURSE PRACTITIONER

## 2023-09-29 RX ORDER — CYCLOBENZAPRINE HCL 10 MG
10 TABLET ORAL 3 TIMES DAILY PRN
Qty: 15 TABLET | Refills: 0 | Status: SHIPPED | OUTPATIENT
Start: 2023-09-29 | End: 2023-10-04

## 2023-09-29 RX ORDER — PREDNISONE 20 MG/1
20 TABLET ORAL DAILY
Qty: 5 TABLET | Refills: 0 | Status: SHIPPED | OUTPATIENT
Start: 2023-09-29 | End: 2023-10-04

## 2023-09-29 NOTE — PROGRESS NOTES
Subjective:      Patient ID: Luigi Daniels is a 43 y.o. male.    Vitals:  height is 6' (1.829 m) and weight is 98 kg (216 lb). His oral temperature is 98.1 °F (36.7 °C). His blood pressure is 148/92 (abnormal) and his pulse is 96. His respiration is 18 and oxygen saturation is 98%.     Chief Complaint: Neck Pain (Muscle pain in upper left side of neck x 3 days. Patient states he has seen Dr Degroot also for adjustments with no success.   )    This is a 43 y.o. male who presents today with a chief complaint of muscle pain in upper left side of neck x 3 days. Patient states he has seen Dr Degroot also for adjustments with no success. Patient states he feels as if he has slept on his neck wrong days ago.        Neck Pain   This is a new problem. The current episode started in the past 7 days. The problem occurs constantly. The pain is associated with nothing. The pain is present in the left side. The quality of the pain is described as aching (throbbing). The pain is at a severity of 4/10. The pain is mild. The symptoms are aggravated by position, twisting and bending. Stiffness is present All day. He has tried chiropractic manipulation and ice for the symptoms. The treatment provided mild relief.       Neck: Positive for neck pain.      Objective:     Physical Exam   Constitutional: He is oriented to person, place, and time. normal  HENT:   Head: Normocephalic and atraumatic.   Nose: Nose normal.   Mouth/Throat: Mucous membranes are moist. Oropharynx is clear.   Eyes: Conjunctivae are normal.   Neck: Neck supple.      Comments: Mild TTP cervical portion of left trapezius muscle.   Cardiovascular: Normal rate, regular rhythm, normal heart sounds and normal pulses.   Pulmonary/Chest: Effort normal and breath sounds normal.   Abdominal: Normal appearance.   Musculoskeletal: Normal range of motion.         General: Normal range of motion.   Neurological: He is alert and oriented to person, place, and time.   Skin: Skin is warm  and dry.   Psychiatric: His behavior is normal.   Vitals reviewed.      Assessment:     1. Strain of left trapezius muscle, initial encounter        Plan:   Meds as prescribed. Follow up as needed.    Strain of left trapezius muscle, initial encounter  -     predniSONE (DELTASONE) 20 MG tablet; Take 1 tablet (20 mg total) by mouth once daily. for 5 days  Dispense: 5 tablet; Refill: 0  -     cyclobenzaprine (FLEXERIL) 10 MG tablet; Take 1 tablet (10 mg total) by mouth 3 (three) times daily as needed for Muscle spasms.  Dispense: 15 tablet; Refill: 0

## 2023-10-17 ENCOUNTER — OFFICE VISIT (OUTPATIENT)
Dept: OPHTHALMOLOGY | Facility: CLINIC | Age: 43
End: 2023-10-17
Payer: COMMERCIAL

## 2023-10-17 DIAGNOSIS — H40.1334 PIGMENTARY GLAUCOMA, BILATERAL, INDETERMINATE STAGE: Primary | ICD-10-CM

## 2023-10-17 PROCEDURE — 99214 PR OFFICE/OUTPT VISIT, EST, LEVL IV, 30-39 MIN: ICD-10-PCS | Mod: S$GLB,,, | Performed by: OPHTHALMOLOGY

## 2023-10-17 PROCEDURE — 99999 PR PBB SHADOW E&M-EST. PATIENT-LVL II: ICD-10-PCS | Mod: PBBFAC,,, | Performed by: OPHTHALMOLOGY

## 2023-10-17 PROCEDURE — 3044F HG A1C LEVEL LT 7.0%: CPT | Mod: CPTII,S$GLB,, | Performed by: OPHTHALMOLOGY

## 2023-10-17 PROCEDURE — 1159F MED LIST DOCD IN RCRD: CPT | Mod: CPTII,S$GLB,, | Performed by: OPHTHALMOLOGY

## 2023-10-17 PROCEDURE — 99214 OFFICE O/P EST MOD 30 MIN: CPT | Mod: S$GLB,,, | Performed by: OPHTHALMOLOGY

## 2023-10-17 PROCEDURE — 99999 PR PBB SHADOW E&M-EST. PATIENT-LVL II: CPT | Mod: PBBFAC,,, | Performed by: OPHTHALMOLOGY

## 2023-10-17 PROCEDURE — 1159F PR MEDICATION LIST DOCUMENTED IN MEDICAL RECORD: ICD-10-PCS | Mod: CPTII,S$GLB,, | Performed by: OPHTHALMOLOGY

## 2023-10-17 PROCEDURE — 3044F PR MOST RECENT HEMOGLOBIN A1C LEVEL <7.0%: ICD-10-PCS | Mod: CPTII,S$GLB,, | Performed by: OPHTHALMOLOGY

## 2023-10-17 PROCEDURE — 1160F PR REVIEW ALL MEDS BY PRESCRIBER/CLIN PHARMACIST DOCUMENTED: ICD-10-PCS | Mod: CPTII,S$GLB,, | Performed by: OPHTHALMOLOGY

## 2023-10-17 PROCEDURE — 1160F RVW MEDS BY RX/DR IN RCRD: CPT | Mod: CPTII,S$GLB,, | Performed by: OPHTHALMOLOGY

## 2023-10-17 RX ORDER — DORZOLAMIDE HYDROCHLORIDE AND TIMOLOL MALEATE 20; 5 MG/ML; MG/ML
1 SOLUTION/ DROPS OPHTHALMIC EVERY 12 HOURS
Qty: 10 ML | Refills: 11 | Status: SHIPPED | OUTPATIENT
Start: 2023-10-17 | End: 2023-12-11 | Stop reason: SDUPTHER

## 2023-10-17 RX ORDER — PREDNISOLONE ACETATE 10 MG/ML
SUSPENSION/ DROPS OPHTHALMIC
Qty: 5 ML | Refills: 0 | Status: SHIPPED | OUTPATIENT
Start: 2023-10-17 | End: 2023-12-11 | Stop reason: ALTCHOICE

## 2023-10-17 NOTE — PROGRESS NOTES
HPI    Pt presents for IOP check     States eyes have been more light sensitive the past week     No current ocular meds     Last edited by Deedee Pelaez on 10/17/2023  2:33 PM.            Assessment /Plan     For exam results, see Encounter Report.    Pigmentary glaucoma, bilateral, indeterminate stage      Neg famhx  Thick pachy    Tmax 27/53  +zentmeyer line OU  Severe damage OS, no evidence of damage OD  S/p SLT OU, limited efficacy at this time    There is mild iritis OS  Query inflammatory component of glaucoma    Recommend IOP treatment OU    Start  Dorz/conrad bid OU    Pred QID OS x 1 week, then BID OS until seen again    F/u 1 month IOP check    [facility]

## 2023-11-06 ENCOUNTER — CLINICAL SUPPORT (OUTPATIENT)
Dept: OPHTHALMOLOGY | Facility: CLINIC | Age: 43
End: 2023-11-06
Payer: COMMERCIAL

## 2023-11-06 DIAGNOSIS — H40.1334 PIGMENTARY GLAUCOMA, BILATERAL, INDETERMINATE STAGE: ICD-10-CM

## 2023-12-11 ENCOUNTER — OFFICE VISIT (OUTPATIENT)
Dept: OPHTHALMOLOGY | Facility: CLINIC | Age: 43
End: 2023-12-11
Payer: COMMERCIAL

## 2023-12-11 DIAGNOSIS — H40.1311 PIGMENTARY GLAUCOMA, RIGHT EYE, MILD STAGE: Primary | ICD-10-CM

## 2023-12-11 DIAGNOSIS — H40.1323 PIGMENTARY GLAUCOMA, LEFT EYE, SEVERE STAGE: ICD-10-CM

## 2023-12-11 PROCEDURE — 1159F PR MEDICATION LIST DOCUMENTED IN MEDICAL RECORD: ICD-10-PCS | Mod: CPTII,S$GLB,, | Performed by: OPHTHALMOLOGY

## 2023-12-11 PROCEDURE — 3044F PR MOST RECENT HEMOGLOBIN A1C LEVEL <7.0%: ICD-10-PCS | Mod: CPTII,S$GLB,, | Performed by: OPHTHALMOLOGY

## 2023-12-11 PROCEDURE — 3044F HG A1C LEVEL LT 7.0%: CPT | Mod: CPTII,S$GLB,, | Performed by: OPHTHALMOLOGY

## 2023-12-11 PROCEDURE — 99999 PR PBB SHADOW E&M-EST. PATIENT-LVL II: ICD-10-PCS | Mod: PBBFAC,,, | Performed by: OPHTHALMOLOGY

## 2023-12-11 PROCEDURE — 1160F PR REVIEW ALL MEDS BY PRESCRIBER/CLIN PHARMACIST DOCUMENTED: ICD-10-PCS | Mod: CPTII,S$GLB,, | Performed by: OPHTHALMOLOGY

## 2023-12-11 PROCEDURE — 99214 PR OFFICE/OUTPT VISIT, EST, LEVL IV, 30-39 MIN: ICD-10-PCS | Mod: S$GLB,,, | Performed by: OPHTHALMOLOGY

## 2023-12-11 PROCEDURE — 1160F RVW MEDS BY RX/DR IN RCRD: CPT | Mod: CPTII,S$GLB,, | Performed by: OPHTHALMOLOGY

## 2023-12-11 PROCEDURE — 99214 OFFICE O/P EST MOD 30 MIN: CPT | Mod: S$GLB,,, | Performed by: OPHTHALMOLOGY

## 2023-12-11 PROCEDURE — 1159F MED LIST DOCD IN RCRD: CPT | Mod: CPTII,S$GLB,, | Performed by: OPHTHALMOLOGY

## 2023-12-11 PROCEDURE — 99999 PR PBB SHADOW E&M-EST. PATIENT-LVL II: CPT | Mod: PBBFAC,,, | Performed by: OPHTHALMOLOGY

## 2023-12-11 RX ORDER — DORZOLAMIDE HYDROCHLORIDE AND TIMOLOL MALEATE 20; 5 MG/ML; MG/ML
1 SOLUTION/ DROPS OPHTHALMIC EVERY 12 HOURS
Qty: 10 ML | Refills: 11 | Status: SHIPPED | OUTPATIENT
Start: 2023-12-11

## 2023-12-11 NOTE — PROGRESS NOTES
HPI    42 YO male presents today for an IOP check/hvf review. Patient notes that   he lost both eye drops around thanksgiving so has not done them since   then. Notes no problems or complaints.       Last edited by Jennifer Navarro on 12/11/2023  8:54 AM.            Assessment /Plan     For exam results, see Encounter Report.    Pigmentary glaucoma, right eye, mild stage    Pigmentary glaucoma, left eye, severe stage      Neg famhx  Thick pachy    Tmax 27/53  +zentmeyer line OU  Severe damage OS, no evidence of damage OD  HVF [place] OS, normal OD  S/p SLT OU, limited efficacy    Pt lost his dorz/conrad, has been off drops  IOP still very poorly controlled OS>OD  Discussed possible need for glaucoma surgery, such as trabeculectomy    Stressed compliance    restart  Dorz/conrad bid OU    F/u 1-2 months, IOP check  Consider trab OS

## 2024-02-05 ENCOUNTER — OFFICE VISIT (OUTPATIENT)
Dept: OPHTHALMOLOGY | Facility: CLINIC | Age: 44
End: 2024-02-05
Payer: COMMERCIAL

## 2024-02-05 DIAGNOSIS — H40.1311 PIGMENTARY GLAUCOMA, RIGHT EYE, MILD STAGE: Primary | ICD-10-CM

## 2024-02-05 DIAGNOSIS — H40.1323 PIGMENTARY GLAUCOMA, LEFT EYE, SEVERE STAGE: ICD-10-CM

## 2024-02-05 PROCEDURE — 99999 PR PBB SHADOW E&M-EST. PATIENT-LVL II: CPT | Mod: PBBFAC,,, | Performed by: OPHTHALMOLOGY

## 2024-02-05 PROCEDURE — 1160F RVW MEDS BY RX/DR IN RCRD: CPT | Mod: CPTII,S$GLB,, | Performed by: OPHTHALMOLOGY

## 2024-02-05 PROCEDURE — 1159F MED LIST DOCD IN RCRD: CPT | Mod: CPTII,S$GLB,, | Performed by: OPHTHALMOLOGY

## 2024-02-05 PROCEDURE — 99213 OFFICE O/P EST LOW 20 MIN: CPT | Mod: S$GLB,,, | Performed by: OPHTHALMOLOGY

## 2024-02-05 PROCEDURE — G2211 COMPLEX E/M VISIT ADD ON: HCPCS | Mod: S$GLB,,, | Performed by: OPHTHALMOLOGY

## 2024-02-05 NOTE — PROGRESS NOTES
HPI    Pt presents for 2 mo IOP check     States no new ocular complaints.     Dorz/conrad BID OU  Last edited by Deedee Pelaez on 2/5/2024  9:06 AM.            Assessment /Plan     For exam results, see Encounter Report.    Pigmentary glaucoma, right eye, mild stage    Pigmentary glaucoma, left eye, severe stage      Neg famhx  Thick pachy    Tmax 27/53  +zentmeyer line OU  Severe damage OS, no evidence of damage OD  HVF [place] OS, normal OD  S/p SLT OU, limited efficacy    IOP much improved, back on dorz/conrad  I think adequate OU    F/u 4 months, IOP check

## 2024-05-30 ENCOUNTER — TELEPHONE (OUTPATIENT)
Dept: OPHTHALMOLOGY | Facility: CLINIC | Age: 44
End: 2024-05-30
Payer: COMMERCIAL

## 2024-05-30 NOTE — TELEPHONE ENCOUNTER
Spoke to pt and rescheduled appt     ----- Message from Tabatha Mccarthy sent at 5/30/2024  4:23 PM CDT -----  Type: Needs Medical Advice  Who Called:  pt  Best Call Back Number: [phone]    Additional Information: Pt is calling the office needs to reschedule appt.please call back and advise.

## 2024-07-09 ENCOUNTER — PATIENT MESSAGE (OUTPATIENT)
Dept: ADMINISTRATIVE | Facility: HOSPITAL | Age: 44
End: 2024-07-09
Payer: COMMERCIAL

## 2024-07-30 ENCOUNTER — OFFICE VISIT (OUTPATIENT)
Dept: OPHTHALMOLOGY | Facility: CLINIC | Age: 44
End: 2024-07-30
Payer: COMMERCIAL

## 2024-07-30 DIAGNOSIS — H40.1323 PIGMENTARY GLAUCOMA, LEFT EYE, SEVERE STAGE: ICD-10-CM

## 2024-07-30 DIAGNOSIS — H40.1311 PIGMENTARY GLAUCOMA, RIGHT EYE, MILD STAGE: Primary | ICD-10-CM

## 2024-07-30 PROCEDURE — G2211 COMPLEX E/M VISIT ADD ON: HCPCS | Mod: S$GLB,,, | Performed by: OPHTHALMOLOGY

## 2024-07-30 PROCEDURE — 1159F MED LIST DOCD IN RCRD: CPT | Mod: CPTII,S$GLB,, | Performed by: OPHTHALMOLOGY

## 2024-07-30 PROCEDURE — 1160F RVW MEDS BY RX/DR IN RCRD: CPT | Mod: CPTII,S$GLB,, | Performed by: OPHTHALMOLOGY

## 2024-07-30 PROCEDURE — 99999 PR PBB SHADOW E&M-EST. PATIENT-LVL II: CPT | Mod: PBBFAC,,, | Performed by: OPHTHALMOLOGY

## 2024-07-30 PROCEDURE — 99213 OFFICE O/P EST LOW 20 MIN: CPT | Mod: S$GLB,,, | Performed by: OPHTHALMOLOGY

## 2024-07-30 NOTE — PROGRESS NOTES
HPI     Glaucoma     Additional comments: 2 month IOP check. Denies eye pain today. Using   Cosopt OU BID states compliance.           Last edited by Mary Bajwa on 7/30/2024  2:02 PM.            Assessment /Plan     For exam results, see Encounter Report.    Pigmentary glaucoma, right eye, mild stage    Pigmentary glaucoma, left eye, severe stage      Neg famhx  Thick pachy    Tmax 27/53  +zentmeyer line OU  Severe damage OS, no evidence of damage OD  HVF [place] OS, normal OD  S/p SLT OU, limited efficacy    IOP stable, I think adequate given Tmax    F/u 4-6 months, HVF 24-2 OD, 10-2 OS  DFE    Visit today is associated with current or anticipated ongoing medical care related to this patients single serious and complex condition, glaucoma.

## 2024-11-02 DIAGNOSIS — H40.1323 PIGMENTARY GLAUCOMA, LEFT EYE, SEVERE STAGE: ICD-10-CM

## 2024-11-02 DIAGNOSIS — H40.1311 PIGMENTARY GLAUCOMA, RIGHT EYE, MILD STAGE: ICD-10-CM

## 2024-11-04 RX ORDER — DORZOLAMIDE HYDROCHLORIDE AND TIMOLOL MALEATE 20; 5 MG/ML; MG/ML
SOLUTION/ DROPS OPHTHALMIC
Qty: 10 ML | Refills: 11 | Status: SHIPPED | OUTPATIENT
Start: 2024-11-04

## 2024-11-09 ENCOUNTER — PATIENT MESSAGE (OUTPATIENT)
Dept: FAMILY MEDICINE | Facility: CLINIC | Age: 44
End: 2024-11-09
Payer: COMMERCIAL

## 2025-02-17 ENCOUNTER — CLINICAL SUPPORT (OUTPATIENT)
Dept: OPHTHALMOLOGY | Facility: CLINIC | Age: 45
End: 2025-02-17
Payer: COMMERCIAL

## 2025-02-17 ENCOUNTER — OFFICE VISIT (OUTPATIENT)
Dept: OPHTHALMOLOGY | Facility: CLINIC | Age: 45
End: 2025-02-17
Payer: COMMERCIAL

## 2025-02-17 DIAGNOSIS — H40.1334 PIGMENTARY GLAUCOMA, BILATERAL, INDETERMINATE STAGE: ICD-10-CM

## 2025-02-17 DIAGNOSIS — H40.1311 PIGMENTARY GLAUCOMA, RIGHT EYE, MILD STAGE: Primary | ICD-10-CM

## 2025-02-17 DIAGNOSIS — H40.1323 PIGMENTARY GLAUCOMA, LEFT EYE, SEVERE STAGE: ICD-10-CM

## 2025-02-17 NOTE — PROGRESS NOTES
24-2 OD, 10-2 OS HVF done.         Assessment /Plan     For exam results, see Encounter Report.    There are no diagnoses linked to this encounter.

## 2025-02-17 NOTE — PROGRESS NOTES
HPI    DLS: 7/3/2024 pt present for 6mo rev HVF/DFE    Pt states not having any new complaints with vision.  Denies   pain/FOL/floaters    Dorz/Conrad BID OU  Last edited by Shiela Brink on 2/17/2025 10:05 AM.            Assessment /Plan     For exam results, see Encounter Report.    Pigmentary glaucoma, right eye, mild stage    Pigmentary glaucoma, left eye, severe stage      Neg famhx  Thick pachy    Tmax 27/53  +zentmeyer line OU  Severe damage OS, no evidence of damage OD  HVF severe damage OS  HVF normal OD x 2   S/p SLT OU, limited efficacy initially    IOP excellent OS, adequate OD    Continue  Dorz/conrad bid OU    F/u 4 months, IOP, OCT NFL    Visit today is associated with current or anticipated ongoing medical care related to this patients single serious and complex condition, glaucoma.

## 2025-04-25 ENCOUNTER — PATIENT MESSAGE (OUTPATIENT)
Dept: ADMINISTRATIVE | Facility: HOSPITAL | Age: 45
End: 2025-04-25
Payer: COMMERCIAL

## 2025-05-14 ENCOUNTER — OFFICE VISIT (OUTPATIENT)
Dept: URGENT CARE | Facility: CLINIC | Age: 45
End: 2025-05-14
Payer: COMMERCIAL

## 2025-05-14 VITALS
BODY MASS INDEX: 29.8 KG/M2 | OXYGEN SATURATION: 98 % | HEART RATE: 102 BPM | HEIGHT: 72 IN | WEIGHT: 220 LBS | SYSTOLIC BLOOD PRESSURE: 135 MMHG | RESPIRATION RATE: 17 BRPM | DIASTOLIC BLOOD PRESSURE: 86 MMHG | TEMPERATURE: 98 F

## 2025-05-14 DIAGNOSIS — L03.031 CELLULITIS OF GREAT TOE OF RIGHT FOOT: Primary | ICD-10-CM

## 2025-05-14 DIAGNOSIS — L60.0 INGROWING NAIL, RIGHT GREAT TOE: ICD-10-CM

## 2025-05-14 PROCEDURE — 99213 OFFICE O/P EST LOW 20 MIN: CPT | Mod: S$GLB,,, | Performed by: NURSE PRACTITIONER

## 2025-05-14 RX ORDER — DOXYCYCLINE 100 MG/1
100 CAPSULE ORAL 2 TIMES DAILY
Qty: 14 CAPSULE | Refills: 0 | Status: SHIPPED | OUTPATIENT
Start: 2025-05-14 | End: 2025-05-21

## 2025-05-14 RX ORDER — MUPIROCIN 20 MG/G
OINTMENT TOPICAL 2 TIMES DAILY
Qty: 30 G | Refills: 0 | Status: SHIPPED | OUTPATIENT
Start: 2025-05-14 | End: 2025-05-21

## 2025-05-14 NOTE — PROGRESS NOTES
"Subjective:       Patient ID: Luigi Daniels is a 45 y.o. male.    Vitals:  height is 6' (1.829 m) and weight is 99.8 kg (220 lb). His oral temperature is 98.1 °F (36.7 °C). His blood pressure is 135/86 and his pulse is 102. His respiration is 17 and oxygen saturation is 98%.     Chief Complaint: Nail Problem    This is a 45 y.o. male who presents today with a chief complaint of "sore " at the end of his big toe. Patient states it has been there about 3 days a worsened over the past 24 hours..  He states it hurts when he puts weight on it or touch it in any way. There has not been an injury to his foot.    No chief complaint on file.        Toe Pain   The incident occurred 3 to 5 days ago. The incident occurred at home. There was no injury mechanism. The pain is present in the right foot. The pain is at a severity of 0/10. The patient is experiencing no pain. Associated symptoms include an inability to bear weight. He reports no foreign bodies present. The symptoms are aggravated by weight bearing. Treatments tried: polysporin.       Constitution: Negative.   Skin:  Positive for erythema.           Objective:      Physical Exam   Constitutional: He is oriented to person, place, and time. He appears well-developed. He is cooperative.  Non-toxic appearance. He does not appear ill. No distress.   HENT:   Head: Normocephalic and atraumatic.   Ears:   Right Ear: Hearing, tympanic membrane, external ear and ear canal normal.   Left Ear: Hearing, tympanic membrane, external ear and ear canal normal.   Nose: Nose normal. No mucosal edema, rhinorrhea or nasal deformity. No epistaxis. Right sinus exhibits no maxillary sinus tenderness and no frontal sinus tenderness. Left sinus exhibits no maxillary sinus tenderness and no frontal sinus tenderness.   Mouth/Throat: Uvula is midline, oropharynx is clear and moist and mucous membranes are normal. No trismus in the jaw. Normal dentition. No uvula swelling. No oropharyngeal " exudate, posterior oropharyngeal edema or posterior oropharyngeal erythema.   Eyes: Conjunctivae and lids are normal. No scleral icterus.   Neck: Trachea normal and phonation normal. Neck supple. No edema present. No erythema present. No neck rigidity present.   Cardiovascular: Normal rate, regular rhythm, normal heart sounds and normal pulses.   Pulmonary/Chest: Effort normal and breath sounds normal. No respiratory distress. He has no decreased breath sounds. He has no rhonchi.   Abdominal: Normal appearance and bowel sounds are normal. Soft.   Musculoskeletal: Normal range of motion.         General: No deformity. Normal range of motion.      Comments: Erythema and swelling noted to medial aspect of right great toe.  Ingrowing nail noted.  Patient does report pain is severely painful to palpation.   Neurological: He is alert and oriented to person, place, and time. He exhibits normal muscle tone. Coordination normal.   Skin: Skin is warm, dry, intact, not diaphoretic and not pale. erythema   Psychiatric: His speech is normal and behavior is normal. Judgment and thought content normal.   Nursing note and vitals reviewed.        Past medical history and current medications reviewed.       Assessment:           1. Cellulitis of great toe of right foot    2. Ingrowing nail, right great toe              Plan:         Cellulitis of great toe of right foot  -     doxycycline (VIBRAMYCIN) 100 MG Cap; Take 1 capsule (100 mg total) by mouth 2 (two) times daily. for 7 days  Dispense: 14 capsule; Refill: 0  -     mupirocin (BACTROBAN) 2 % ointment; Apply topically 2 (two) times daily. for 7 days  Dispense: 30 g; Refill: 0  -     Ambulatory referral/consult to Podiatry    Ingrowing nail, right great toe  -     Ambulatory referral/consult to Podiatry             There are no Patient Instructions on file for this visit.         BO Aguila

## 2025-05-19 ENCOUNTER — OFFICE VISIT (OUTPATIENT)
Dept: PODIATRY | Facility: CLINIC | Age: 45
End: 2025-05-19
Payer: COMMERCIAL

## 2025-05-19 VITALS
BODY MASS INDEX: 29.8 KG/M2 | WEIGHT: 220 LBS | DIASTOLIC BLOOD PRESSURE: 97 MMHG | SYSTOLIC BLOOD PRESSURE: 149 MMHG | HEART RATE: 97 BPM | HEIGHT: 72 IN

## 2025-05-19 DIAGNOSIS — L60.0 INGROWN NAIL: ICD-10-CM

## 2025-05-19 DIAGNOSIS — L03.031 PARONYCHIA OF GREAT TOE, RIGHT: Primary | ICD-10-CM

## 2025-05-19 PROCEDURE — 3008F BODY MASS INDEX DOCD: CPT | Mod: S$GLB,,, | Performed by: PODIATRIST

## 2025-05-19 PROCEDURE — 3080F DIAST BP >= 90 MM HG: CPT | Mod: S$GLB,,, | Performed by: PODIATRIST

## 2025-05-19 PROCEDURE — 1159F MED LIST DOCD IN RCRD: CPT | Mod: S$GLB,,, | Performed by: PODIATRIST

## 2025-05-19 PROCEDURE — 99202 OFFICE O/P NEW SF 15 MIN: CPT | Mod: S$GLB,,, | Performed by: PODIATRIST

## 2025-05-19 PROCEDURE — 99999 PR PBB SHADOW E&M-EST. PATIENT-LVL III: CPT | Mod: PBBFAC,,, | Performed by: PODIATRIST

## 2025-05-19 PROCEDURE — 1160F RVW MEDS BY RX/DR IN RCRD: CPT | Mod: S$GLB,,, | Performed by: PODIATRIST

## 2025-05-19 PROCEDURE — 3077F SYST BP >= 140 MM HG: CPT | Mod: S$GLB,,, | Performed by: PODIATRIST

## 2025-05-19 NOTE — PROGRESS NOTES
Subjective:       Patient ID: Luigi Daniels is a 45 y.o. male.    Chief Complaint: Ingrown Toenail  Patient presents with a complaint of an ingrown toenail in his right great toe he states he went to urgent care last Wednesday they gave him a prescription for antibiotic ointment and doxycycline which he started Wednesday night was not sure if the nail is ingrown or not he states it was very red which has improved since he started the antibiotics.  Patient relates no history of previous ingrown toenails he states he is on his feet 9-10 hours a day and really does not take care of his feet very well.    History reviewed. No pertinent past medical history.  History reviewed. No pertinent surgical history.  Family History   Problem Relation Name Age of Onset    Arthritis Maternal Grandmother Edilia archuleta     Diabetes Maternal Grandmother Edilia archuleta      Social History     Socioeconomic History    Marital status:    Tobacco Use    Smoking status: Never     Passive exposure: Never    Smokeless tobacco: Never   Substance and Sexual Activity    Alcohol use: Yes     Alcohol/week: 2.0 standard drinks of alcohol     Types: 2 Drinks containing 0.5 oz of alcohol per week    Drug use: Never    Sexual activity: Yes     Partners: Female     Birth control/protection: Condom       Current Medications[1]  Review of patient's allergies indicates:  No Known Allergies    Review of Systems   Skin:  Positive for color change.   All other systems reviewed and are negative.      Objective:      Vitals:    05/19/25 0817   BP: (!) 149/97   Pulse: 97   Weight: 99.8 kg (220 lb 0.3 oz)   Height: 6' (1.829 m)     Physical Exam  Vitals and nursing note reviewed.   Constitutional:       Appearance: Normal appearance.   Cardiovascular:      Pulses:           Dorsalis pedis pulses are 2+ on the right side and 2+ on the left side.        Posterior tibial pulses are 2+ on the right side and 2+ on the left side.   Pulmonary:      Effort:  Pulmonary effort is normal.   Musculoskeletal:         General: Swelling and tenderness present.        Feet:    Feet:      Right foot:      Protective Sensation: 2 sites tested.  2 sites sensed.      Skin integrity: Erythema and warmth present.      Toenail Condition: Right toenails are ingrown.      Left foot:      Protective Sensation: 2 sites tested.  2 sites sensed.      Skin integrity: Skin integrity normal.   Skin:     Capillary Refill: Capillary refill takes less than 2 seconds.      Findings: Erythema present.   Neurological:      General: No focal deficit present.      Mental Status: He is alert.   Psychiatric:         Mood and Affect: Mood normal.         Behavior: Behavior normal.              Assessment:       1. Paronychia of great toe, right    2. Ingrown nail        Plan:       Patient presents with a complaint of an ingrown toenail in his right great toe he states he went to urgent care last Wednesday they gave him a prescription for antibiotic ointment and doxycycline which he started Wednesday night was not sure if the nail is ingrown or not he states it was very red which has improved since he started the antibiotics.  Patient relates no history of previous ingrown toenails he states he is on his feet 9-10 hours a day and really does not take care of his feet very well.  A comprehensive new patient evaluation was performed today patient has signs of ingrowing nail with early signs of infection along the distal medial border of the right great toe.  Positive erythema positive edema is noted patient has only mild discomfort upon palpation of the area.  I did advised the patient clearly the area was getting infected there is no active drainage the fact that the redness and swelling has gone down some indicates the doxycycline is working well patient has also been soaking which is very helpful.  Patient did not require a nail avulsion I was able to carefully trim and debride the nail from the distal  medial border of the right great toe relieving the patient's discomfort there was a lot of nail and debris in the corner that was growing into the surrounding skin.  Bacitracin ointment and a bandage applied patient will repeat this for the next 3 days soaking for the next 3 evenings finishing his doxycycline I advised him any increased redness swelling drainage or discomfort he will have to contact us for re-evaluation if this is not completely resolved in 4-5 days he should also contact us.  Patient advised he will need to make sure that this nail border stays clear and the nail does not grow back in the same place.  Follow-up as needed.This note was created using [employer] voice recognition software that occasionally misinterpreted phrases or words.        [1]   Current Outpatient Medications   Medication Sig Dispense Refill    dorzolamide-timolol 2-0.5% (COSOPT) 22.3-6.8 mg/mL ophthalmic solution PLACE 1 DROP INTO BOTH EYES EVERY 12 HOURS. 10 mL 11    doxycycline (VIBRAMYCIN) 100 MG Cap Take 1 capsule (100 mg total) by mouth 2 (two) times daily. for 7 days 14 capsule 0    mupirocin (BACTROBAN) 2 % ointment Apply topically 2 (two) times daily. for 7 days 30 g 0     No current facility-administered medications for this visit.